# Patient Record
Sex: MALE | Race: WHITE | NOT HISPANIC OR LATINO | Employment: FULL TIME | URBAN - METROPOLITAN AREA
[De-identification: names, ages, dates, MRNs, and addresses within clinical notes are randomized per-mention and may not be internally consistent; named-entity substitution may affect disease eponyms.]

---

## 2019-09-17 ENCOUNTER — OFFICE VISIT (OUTPATIENT)
Dept: DERMATOLOGY | Facility: CLINIC | Age: 39
End: 2019-09-17
Payer: COMMERCIAL

## 2019-09-17 DIAGNOSIS — L91.8 SKIN TAG: ICD-10-CM

## 2019-09-17 DIAGNOSIS — L72.0 EIC (EPIDERMAL INCLUSION CYST): ICD-10-CM

## 2019-09-17 DIAGNOSIS — Z12.83 SKIN CANCER SCREENING: Primary | ICD-10-CM

## 2019-09-17 ASSESSMENT — PAIN SCALES - GENERAL: PAINLEVEL: NO PAIN (0)

## 2019-09-17 NOTE — LETTER
9/17/2019       RE: Ben Agustin  [address]  [facility] [address]     Dear Colleague,    Thank you for referring your patient, Ben Agustin, to the [facility]ERMAT[facility] at [facility]. Please see a copy of my visit note below.    [facility] Dermatology Note      Dermatology Problem List:  1.Cyst on the right lateral lower back  -Patient will consider treatment at a later date    Encounter Date: Sep 17, 2019    CC:  Chief Complaint   Patient presents with     Skin Check     Ben is here today for a skin check- Ben notes an area of concern on his back.          History of Present Illness:  Mr. Ben Agustin is a 39 year old male who is new to the clinic and presents for a skin check. At today's visit, the patient notes a lesion of concern on his back. He states the lesion has been previously diagnosed as a mole by an outside general practitioner and has been present for over 15 years. He notes tightness in his back on the same side of the mole of concern when he runs. The patient denies a personal and family history of melanoma. The patient denies painful, itching, tingling or bleeding lesions unless otherwise noted.    Past Medical History:   Patient Active Problem List   Diagnosis     Nausea     Screening for cardiovascular condition     History reviewed. No pertinent past medical history.  History reviewed. No pertinent surgical history.    Social History:   reports that he has never smoked. He has never used smokeless tobacco. He reports that he drinks alcohol. He reports that he does not use drugs.    Family History:  Family History   Problem Relation Age of Onset     Hypertension Father      Cancer Mother      Arthritis Maternal Grandfather      Cerebrovascular Disease Paternal Grandfather      Hypertension Paternal Grandfather      Melanoma No family hx of      Skin Cancer No family hx of        Medications:  No current outpatient  medications on file.       Allergies   Allergen Reactions     Seasonal Allergies      Congestion, itching eyes, sneezing        Review of Systems:  -Constitutional: The patient denies fatigue, fevers, chills, unintended weight loss, and night sweats.  -HEENT: Patient denies nonhealing oral sores.  -Skin: As above in HPI. No additional skin concerns.    Physical exam:  Vitals: There were no vitals taken for this visit.  GEN: This is a well developed, well-nourished male in no acute distress, in a pleasant mood.    SKIN: Full skin, which includes the head/face, both arms, chest, back, abdomen,both legs, genitalia and/or groin buttocks, digits and/or nails, was examined.  -2mm pedunculated papule on the right lateral canthus  -Right lower lateral back: 1cm indurated flesh colored papule with a central punctum   -Pacheco's skin type II, less than 100 nevi.  -No other lesions of concern on areas examined.       Impression/Plan:  1. Skin cancer screening    ABCDs of melanoma were discussed and self skin checks were advised.    Sun precaution was advised including the use of sun screens of SPF 30 or higher, sun protective clothing, and avoidance of tanning beds.    Provided sunscreen, melanoma and sun protective clothing handouts    2. Skin tag - right lateral canthus x1    Reassured benign    Not currently bothersome to patient    3. Cyst on right lateral lower back    Educated on the etiology    Reassured benign    Discussed removal via excision should patient wish to remove this    Patient deferred scheduling for excision at this time, will consider excision at a later date    CC Dr. Hickey on close of this encounter.  Follow-up prn for new or changing lesions.       Staff Involved:  Staff/Scribe    Scribe Disclosure:  Jose MARIE, am serving as a scribe to document services personally performed by Mary Rubi PA-C, based on data collection and the provider's statements to me.     Provider Disclosure:   The  documentation recorded by the scribe accurately reflects the services I personally performed and the decisions made by me.    All risks, benefits and alternatives were discussed with patient.  Patient is in agreement and understands the assessment and plan.  All questions were answered.    Mary Rubi PA-C  [facility]: Phone: [phone], Fax: [phone]                      Again, thank you for allowing me to participate in the care of your patient.      Sincerely,    Mary Rubi PA-C

## 2019-09-17 NOTE — NURSING NOTE
Dermatology Rooming Note    Ben Agustin's goals for this visit include:   Chief Complaint   Patient presents with     Skin Check     Ben is here today for a skin check- Ben notes an area of concern on his back.      ALAN Malik

## 2019-09-17 NOTE — LETTER
Date:September 18, 2019      Patient was self referred, no letter generated. Do not send.        [facility] Health Information

## 2019-09-17 NOTE — PROGRESS NOTES
[facility] Dermatology Note      Dermatology Problem List:  1.Cyst on the right lateral lower back  -Patient will consider treatment at a later date    Encounter Date: Sep 17, 2019    CC:  Chief Complaint   Patient presents with     Skin Check     Ben is here today for a skin check- Ben notes an area of concern on his back.          History of Present Illness:  Mr. Ben Agustin is a 39 year old male who is new to the clinic and presents for a skin check. At today's visit, the patient notes a lesion of concern on his back. He states the lesion has been previously diagnosed as a mole by an outside general practitioner and has been present for over 15 years. He notes tightness in his back on the same side of the mole of concern when he runs. The patient denies a personal and family history of melanoma. The patient denies painful, itching, tingling or bleeding lesions unless otherwise noted.    Past Medical History:   Patient Active Problem List   Diagnosis     Nausea     Screening for cardiovascular condition     History reviewed. No pertinent past medical history.  History reviewed. No pertinent surgical history.    Social History:   reports that he has never smoked. He has never used smokeless tobacco. He reports that he drinks alcohol. He reports that he does not use drugs.    Family History:  Family History   Problem Relation Age of Onset     Hypertension Father      Cancer Mother      Arthritis Maternal Grandfather      Cerebrovascular Disease Paternal Grandfather      Hypertension Paternal Grandfather      Melanoma No family hx of      Skin Cancer No family hx of        Medications:  No current outpatient medications on file.       Allergies   Allergen Reactions     Seasonal Allergies      Congestion, itching eyes, sneezing        Review of Systems:  -Constitutional: The patient denies fatigue, fevers, chills, unintended weight loss, and night sweats.  -HEENT: Patient denies nonhealing oral  sores.  -Skin: As above in HPI. No additional skin concerns.    Physical exam:  Vitals: There were no vitals taken for this visit.  GEN: This is a well developed, well-nourished male in no acute distress, in a pleasant mood.    SKIN: Full skin, which includes the head/face, both arms, chest, back, abdomen,both legs, genitalia and/or groin buttocks, digits and/or nails, was examined.  -2mm pedunculated papule on the right lateral canthus  -Right lower lateral back: 1cm indurated flesh colored papule with a central punctum   -Pacheco's skin type II, less than 100 nevi.  -No other lesions of concern on areas examined.       Impression/Plan:  1. Skin cancer screening    ABCDs of melanoma were discussed and self skin checks were advised.    Sun precaution was advised including the use of sun screens of SPF 30 or higher, sun protective clothing, and avoidance of tanning beds.    Provided sunscreen, melanoma and sun protective clothing handouts    2. Skin tag - right lateral canthus x1    Reassured benign    Not currently bothersome to patient    3. Cyst on right lateral lower back    Educated on the etiology    Reassured benign    Discussed removal via excision should patient wish to remove this    Patient deferred scheduling for excision at this time, will consider excision at a later date    CC Dr. Hickey on close of this encounter.  Follow-up prn for new or changing lesions.       Staff Involved:  Staff/Scribe    Scribe Disclosure:  I, Jose Cleveland, am serving as a scribe to document services personally performed by Mary Rubi PA-C, based on data collection and the provider's statements to me.     Provider Disclosure:   The documentation recorded by the scribe accurately reflects the services I personally performed and the decisions made by me.    All risks, benefits and alternatives were discussed with patient.  Patient is in agreement and understands the assessment and plan.  All questions were  answered.    Mary Rubi PA-C  [facility]: Phone: [phone], Fax: [phone]

## 2019-09-17 NOTE — PATIENT INSTRUCTIONS
Sun protective clothing and Resources     L[place] End (www.Pingup.com)  Athleta ([email or web address])  Barb Life ([email or web address] Beisbol)  Carve Designs (App DreamWorks) - affordable  Skinz (uvskinz.com)    Long sleeve - Emily Cool DRI UPF 50 or [place] PFG UPF 50  Hoodie - [place] PFG UPF 50  Swimshirt/Rash Guard - Enriqueta UPF 50 (on Amazon)  Neck - Outdoor Research Ubertubes (www.outdoorresAdspired Technologies.com)  The ABCDEs of Melanoma    Skin cancer can develop anywhere on the skin. Ask someone for help when checking your skin, especially in hard to see places. If you notice a mole different from others, or that changes, enlarges, itches, or bleeds (even if it is small), you should see a dermatologist.

## 2021-03-20 ENCOUNTER — HEALTH MAINTENANCE LETTER (OUTPATIENT)
Age: 41
End: 2021-03-20

## 2021-10-24 ENCOUNTER — HEALTH MAINTENANCE LETTER (OUTPATIENT)
Age: 41
End: 2021-10-24

## 2022-04-10 ENCOUNTER — HEALTH MAINTENANCE LETTER (OUTPATIENT)
Age: 42
End: 2022-04-10

## 2022-10-16 ENCOUNTER — HEALTH MAINTENANCE LETTER (OUTPATIENT)
Age: 42
End: 2022-10-16

## 2023-06-01 ENCOUNTER — HEALTH MAINTENANCE LETTER (OUTPATIENT)
Age: 43
End: 2023-06-01

## 2023-07-09 ASSESSMENT — ENCOUNTER SYMPTOMS
SHORTNESS OF BREATH: 0
DYSURIA: 0
JOINT SWELLING: 0
HEADACHES: 0
ARTHRALGIAS: 0
NAUSEA: 0
HEMATURIA: 0
PALPITATIONS: 0
FEVER: 0
CONSTIPATION: 0
EYE PAIN: 0
SORE THROAT: 0
NERVOUS/ANXIOUS: 1
WEAKNESS: 0
FREQUENCY: 0
ABDOMINAL PAIN: 0
PARESTHESIAS: 0
MYALGIAS: 0
HEMATOCHEZIA: 0
CHILLS: 0
DIARRHEA: 0
COUGH: 0
DIZZINESS: 0
HEARTBURN: 0

## 2023-07-10 ENCOUNTER — OFFICE VISIT (OUTPATIENT)
Dept: FAMILY MEDICINE | Facility: CLINIC | Age: 43
End: 2023-07-10
Payer: COMMERCIAL

## 2023-07-10 VITALS
TEMPERATURE: 97.5 F | HEART RATE: 79 BPM | HEIGHT: 73 IN | WEIGHT: 198 LBS | OXYGEN SATURATION: 98 % | DIASTOLIC BLOOD PRESSURE: 79 MMHG | SYSTOLIC BLOOD PRESSURE: 134 MMHG | BODY MASS INDEX: 26.24 KG/M2 | RESPIRATION RATE: 14 BRPM

## 2023-07-10 DIAGNOSIS — R06.83 SNORING: ICD-10-CM

## 2023-07-10 DIAGNOSIS — Z11.4 SCREENING FOR HIV (HUMAN IMMUNODEFICIENCY VIRUS): ICD-10-CM

## 2023-07-10 DIAGNOSIS — Z13.1 SCREENING FOR DIABETES MELLITUS: ICD-10-CM

## 2023-07-10 DIAGNOSIS — Z00.00 ROUTINE GENERAL MEDICAL EXAMINATION AT A HEALTH CARE FACILITY: Primary | ICD-10-CM

## 2023-07-10 DIAGNOSIS — Z11.59 NEED FOR HEPATITIS C SCREENING TEST: ICD-10-CM

## 2023-07-10 DIAGNOSIS — Z13.220 SCREENING CHOLESTEROL LEVEL: ICD-10-CM

## 2023-07-10 DIAGNOSIS — Z13.21 ENCOUNTER FOR VITAMIN DEFICIENCY SCREENING: ICD-10-CM

## 2023-07-10 LAB
ANION GAP SERPL CALCULATED.3IONS-SCNC: 13 MMOL/L (ref 7–15)
BUN SERPL-MCNC: 8 MG/DL (ref 6–20)
CALCIUM SERPL-MCNC: 9.7 MG/DL (ref 8.6–10)
CHLORIDE SERPL-SCNC: 102 MMOL/L (ref 98–107)
CHOLEST SERPL-MCNC: 296 MG/DL
CREAT SERPL-MCNC: 0.72 MG/DL (ref 0.67–1.17)
DEPRECATED CALCIDIOL+CALCIFEROL SERPL-MC: 32 UG/L (ref 20–75)
DEPRECATED HCO3 PLAS-SCNC: 24 MMOL/L (ref 22–29)
GFR SERPL CREATININE-BSD FRML MDRD: >90 ML/MIN/1.73M2
GLUCOSE SERPL-MCNC: 107 MG/DL (ref 70–99)
HCV AB SERPL QL IA: NONREACTIVE
HDLC SERPL-MCNC: 27 MG/DL
LDLC SERPL CALC-MCNC: ABNORMAL MG/DL
LDLC SERPL DIRECT ASSAY-MCNC: 50 MG/DL
NONHDLC SERPL-MCNC: 269 MG/DL
POTASSIUM SERPL-SCNC: 3.9 MMOL/L (ref 3.4–5.3)
SODIUM SERPL-SCNC: 139 MMOL/L (ref 136–145)
TRIGL SERPL-MCNC: 1167 MG/DL

## 2023-07-10 PROCEDURE — 90471 IMMUNIZATION ADMIN: CPT | Performed by: FAMILY MEDICINE

## 2023-07-10 PROCEDURE — 80061 LIPID PANEL: CPT | Performed by: FAMILY MEDICINE

## 2023-07-10 PROCEDURE — 90715 TDAP VACCINE 7 YRS/> IM: CPT | Performed by: FAMILY MEDICINE

## 2023-07-10 PROCEDURE — 99386 PREV VISIT NEW AGE 40-64: CPT | Mod: 25 | Performed by: FAMILY MEDICINE

## 2023-07-10 PROCEDURE — 36415 COLL VENOUS BLD VENIPUNCTURE: CPT | Performed by: FAMILY MEDICINE

## 2023-07-10 PROCEDURE — 82306 VITAMIN D 25 HYDROXY: CPT | Performed by: FAMILY MEDICINE

## 2023-07-10 PROCEDURE — 83721 ASSAY OF BLOOD LIPOPROTEIN: CPT | Mod: 59 | Performed by: FAMILY MEDICINE

## 2023-07-10 PROCEDURE — 80048 BASIC METABOLIC PNL TOTAL CA: CPT | Performed by: FAMILY MEDICINE

## 2023-07-10 PROCEDURE — 86803 HEPATITIS C AB TEST: CPT | Performed by: FAMILY MEDICINE

## 2023-07-10 ASSESSMENT — ENCOUNTER SYMPTOMS
DYSURIA: 0
PALPITATIONS: 0
MYALGIAS: 0
SHORTNESS OF BREATH: 0
CHILLS: 0
ABDOMINAL PAIN: 0
COUGH: 0
HEARTBURN: 0
EYE PAIN: 0
PARESTHESIAS: 0
NAUSEA: 0
HEMATURIA: 0
WEAKNESS: 0
NERVOUS/ANXIOUS: 1
FREQUENCY: 0
DIARRHEA: 0
JOINT SWELLING: 0
HEADACHES: 0
FEVER: 0
ARTHRALGIAS: 0
HEMATOCHEZIA: 0
CONSTIPATION: 0
DIZZINESS: 0
SORE THROAT: 0

## 2023-07-10 ASSESSMENT — PAIN SCALES - GENERAL: PAINLEVEL: NO PAIN (0)

## 2023-07-10 NOTE — PROGRESS NOTES
SUBJECTIVE:   CC: Ben is an 42 year old who presents for preventative health visit.       7/10/2023    10:00 AM   Additional Questions   Roomed by Phani RODRIGUEZ     Healthy Habits:     Getting at least 3 servings of Calcium per day:  Yes    Bi-annual eye exam:  Yes    Dental care twice a year:  NO    Sleep apnea or symptoms of sleep apnea:  Daytime drowsiness and Sleep apnea    Diet:  Regular (no restrictions)    Frequency of exercise:  4-5 days/week    Duration of exercise:  15-30 minutes    Taking medications regularly:  Yes    Medication side effects:  Not applicable    Additional concerns today:  Yes    He has some concerns about occasional snoring and occasionally holding his breath when sleeping.  He denies significant daytime somnolence.  His wife has never noticed that he regularly stops breathing at night.  He is interested in pursuing further evaluation by getting a sleep study for this.    Social history: , 6-year-old son.  He works as a  in media arts.    Today's PHQ-2 Score:       7/9/2023     3:56 PM   PHQ-2 ( 1999 Pfizer)   Q1: Little interest or pleasure in doing things 1   Q2: Feeling down, depressed or hopeless 1   PHQ-2 Score 2   Q1: Little interest or pleasure in doing things Several days   Q2: Feeling down, depressed or hopeless Several days   PHQ-2 Score 2                   Social History     Tobacco Use     Smoking status: Never     Smokeless tobacco: Never   Substance Use Topics     Alcohol use: Not Currently     Comment: Very minimal alcohol use             7/9/2023     3:54 PM   Alcohol Use   Prescreen: >3 drinks/day or >7 drinks/week? No          No data to display                Last PSA: No results found for: PSA    Reviewed orders with patient. Reviewed health maintenance and updated orders accordingly - Yes  Lab work is in process  Labs reviewed in EPIC    Reviewed and updated as needed this visit by clinical staff   Tobacco  Allergies  Meds              Reviewed  "and updated as needed this visit by Provider     Meds                 Review of Systems   Constitutional: Negative for chills and fever.   HENT: Negative for congestion, ear pain, hearing loss and sore throat.    Eyes: Negative for pain and visual disturbance.   Respiratory: Negative for cough and shortness of breath.    Cardiovascular: Negative for chest pain, palpitations and peripheral edema.   Gastrointestinal: Negative for abdominal pain, constipation, diarrhea, heartburn, hematochezia and nausea.   Genitourinary: Negative for dysuria, frequency, genital sores, hematuria, impotence, penile discharge and urgency.   Musculoskeletal: Negative for arthralgias, joint swelling and myalgias.   Skin: Negative for rash.   Neurological: Negative for dizziness, weakness, headaches and paresthesias.   Psychiatric/Behavioral: Negative for mood changes. The patient is nervous/anxious.          OBJECTIVE:   /79   Pulse 79   Temp 97.5  F (36.4  C) (Temporal)   Resp 14   Ht 1.849 m (6' 0.8\")   Wt 89.8 kg (198 lb)   SpO2 98%   BMI 26.27 kg/m      Physical Exam  GENERAL: healthy, alert and no distress  EYES: Eyes grossly normal to inspection, PERRL and conjunctivae and sclerae normal  HENT: ear canals and TM's normal, nose and mouth without ulcers or lesions  NECK: no adenopathy, no asymmetry, masses, or scars and thyroid normal to palpation  RESP: lungs clear to auscultation - no rales, rhonchi or wheezes  CV: regular rate and rhythm, normal S1 S2, no S3 or S4, no murmur, click or rub, no peripheral edema and peripheral pulses strong  ABDOMEN: soft, nontender, no hepatosplenomegaly, no masses and bowel sounds normal  MS: no gross musculoskeletal defects noted, no edema  SKIN: no suspicious lesions or rashes  NEURO: Normal strength and tone, mentation intact and speech normal  PSYCH: mentation appears normal, affect normal/bright    Diagnostic Test Results:  Labs reviewed in Epic    ASSESSMENT/PLAN:       ICD-10-CM  "   1. Routine general medical examination at a health care facility  Z00.00       2. Snoring  R06.83 Adult Sleep Eval & Management  Referral      3. Screening for HIV (human immunodeficiency virus)  Z11.4       4. Need for hepatitis C screening test  Z11.59 Hepatitis C Screen Reflex to HCV RNA Quant and Genotype     Hepatitis C Screen Reflex to HCV RNA Quant and Genotype      5. Screening cholesterol level  Z13.220 Lipid panel reflex to direct LDL Non-fasting     Lipid panel reflex to direct LDL Non-fasting      6. Screening for diabetes mellitus  Z13.1 Basic metabolic panel  (Ca, Cl, CO2, Creat, Gluc, K, Na, BUN)     Basic metabolic panel  (Ca, Cl, CO2, Creat, Gluc, K, Na, BUN)      7. Encounter for vitamin deficiency screening  Z13.21 Vitamin D Deficiency     Vitamin D Deficiency        We discussed colon cancer screening options and he would like to talk it over with his wife regarding whether or not he should do the colonoscopy or Cologuard test once he turns 43.  He is going to send me a message and let me know soon.    Patient has been advised of split billing requirements and indicates understanding: Yes      COUNSELING:   Reviewed preventive health counseling, as reflected in patient instructions       Regular exercise       Healthy diet/nutrition       Colorectal cancer screening        He reports that he has never smoked. He has never used smokeless tobacco.            Edward Santos, [facility]

## 2023-07-17 ENCOUNTER — VIRTUAL VISIT (OUTPATIENT)
Dept: FAMILY MEDICINE | Facility: CLINIC | Age: 43
End: 2023-07-17
Payer: COMMERCIAL

## 2023-07-17 DIAGNOSIS — R73.9 HYPERGLYCEMIA: ICD-10-CM

## 2023-07-17 DIAGNOSIS — E78.1 HYPERTRIGLYCERIDEMIA: Primary | ICD-10-CM

## 2023-07-17 PROCEDURE — 99214 OFFICE O/P EST MOD 30 MIN: CPT | Mod: VID | Performed by: FAMILY MEDICINE

## 2023-07-17 NOTE — PROGRESS NOTES
"Ben is a 42 year old who is being evaluated via a billable video visit.      How would you like to obtain your AVS? MyChart  If the video visit is dropped, the invitation should be resent by: Send to e-mail at: [email or web address]  Will anyone else be joining your video visit? No          Assessment & Plan     Hypertriglyceridemia  Recent nonfasting labs showed a significantly elevated triglyceride level of 1167.  He reports that he has been eating very fatty meals just prior to having this test and he is interested in having it rechecked.  I think this is reasonable.  He is planning to schedule this later in August or September.  We discussed some dietary modifications that can help.  - Lipid Profile (Chol, Trig, HDL, LDL calc); Future    Hyperglycemia  His lab results showed a nonfasting glucose of 107 which is technically normal.  In 2015 labs checked at that time showed a slightly elevated glucose and is not clear if he was fasting at the time of that lab draw.  I recommended that we check a fasting glucose when he comes in for labs next month.  We are also going to include a hemoglobin A1c level.  - Glucose; Future  - Hemoglobin A1c; Future             BMI:   Estimated body mass index is 26.27 kg/m  as calculated from the following:    Height as of 7/10/23: 1.849 m (6' 0.8\").    Weight as of 7/10/23: 89.8 kg (198 lb).   Weight management plan: Discussed healthy diet and exercise guidelines        Edward Santos, [facility]   Ben is a 42 year old, presenting for the following health issues:  Follow Up        7/17/2023     4:24 PM   Additional Questions   Roomed by Phani RODRIGUEZ     History of Present Illness       Reason for visit:  Follow-up to discuss cholesterol levels    He eats 4 or more servings of fruits and vegetables daily.He consumes 0 sweetened beverage(s) daily.He exercises with enough effort to increase his heart rate 30 to 60 minutes per day.  He exercises with " enough effort to increase his heart rate 4 days per week.   He is taking medications regularly.             He scheduled a virtual follow-up visit to discuss recent lab results from 7/10/2023 in which his triglycerides were very high at 1167.  The total cholesterol was 296, HDL 27 and LDL 50.  His glucose was 107.  He reports being under a significant amount of stress lately and he had been eating several high fatty meals on the days leading up to the test and on the morning of the test.  He is interested in having this rechecked at some point fairly soon if possible.  He is unaware of a strong family history of hyperlipidemia and he reports that there is no known family history of diabetes.      Review of Systems   Constitutional, HEENT, cardiovascular, pulmonary, GI, , musculoskeletal, neuro, skin, endocrine and psych systems are negative, except as otherwise noted.      Objective    Vitals - Patient Reported  Pain Score: No Pain (0)      Vitals:  No vitals were obtained today due to virtual visit.    Physical Exam   GENERAL: Healthy, alert and no distress  EYES: Eyes grossly normal to inspection.  No discharge or erythema, or obvious scleral/conjunctival abnormalities.  RESP: No audible wheeze, cough, or visible cyanosis.  No visible retractions or increased work of breathing.    SKIN: Visible skin clear. No significant rash, abnormal pigmentation or lesions.  NEURO: Cranial nerves grossly intact.  Mentation and speech appropriate for age.  PSYCH: Mentation appears normal, affect normal/bright, judgement and insight intact, normal speech and appearance well-groomed.                Video-Visit Details    Type of service:  Video Visit     Originating Location (pt. Location): Home    Distant Location (provider location):  On-site  Platform used for Video Visit: [email or web address]

## 2023-10-10 NOTE — PROGRESS NOTES
Outpatient Sleep Medicine Consultation:      Name: Ben Agustin MRN# [id number]   Age: 43 year old YOB: 1980     Date of Consultation: October 11, 2023  Consultation is requested by: Edward Santos DO  [address] [facility] [address]  [place] [address] Edward Santos  Primary care provider: No Ref-Primary, Physician       Reason for Sleep Consult:     Ben Agustin is sent by Edward Santos for a sleep consultation regarding snoring.    Patient s Reason for visit  Ben Agustin main reason for visit: holding breath \ sleep apnea?  Patient states problem(s) started: teenage years - college  Ben Agustin's goals for this visit: sleep apnea test? breathing aid?           Assessment and Plan:     Summary Sleep Diagnoses:  (R06.83) Snoring (primary encounter diagnosis) (R06.81) Witnessed apneic spells  Comment: Ben is a 43-year-old male who presents to the sleep clinic with symptoms of snoring, witnessed apneic spells, and occasionally feeling tired during the day. Ben has been told by friends for a long time that it seems like he has apnea and stops breathing in the night. Snoring is worse on his back. He tries to sleep only on his sides but finds himself waking on his back. He will occasionally have a snort/gasp arousal. He generally feels rested in the morning and has pretty good energy throughout the day. However, there are periods in the week when he feels exhausted but he attributes this to being busy at work. He denies difficulty initiating and maintaining sleep. He will nap 1-2 times per week, but he does not unintentionally doze. He denies restless legs. No unusual nocturnal behaviors. His STOP-BANG is 4/8- snoring, tiredness, observed apnea, and male gender.         Plan: HST-Home Sleep Apnea Test - Noxturnal Returnable  Ben is at intermediate risk for obstructive sleep apnea. Recommended a home sleep test. We reviewed treatment options for obstructive sleep apnea  including PAP therapy, mandibular advancement device, positional therapy, surgery, weight management, and hypoglossal nerve stimulator.     Comorbid Diagnoses:  No relevant medical history    Summary Recommendations:  Orders Placed This Encounter   Procedures    HST-Home Sleep Apnea Test - Noxturnal Returnable     Summary Counseling:    Sleep Testing Reviewed  Obstructive Sleep Apnea Reviewed  Complications of Untreated Sleep Apnea Reviewed    Patient will follow up 2 weeks after sleep study.   SHAUNA Suggs.    Total time spent reviewing medical records, history and physical examination, review of previous testing and interpretation as well as documentation on this date: 35 minutes    CC: Edward Santos DO          History of Present Illness:     Ben presents to the sleep clinic with concerns of snoring, witnessed apneic episodes, and feeling tired during the day. Snoring is worse on his back. He tries to sleep only on his sides. He will occasionally have a snort/gasp arousal. He generally feels rested in the morning and has pretty good energy throughout the day. There are periods in the week he feels exhausted but he attributes this to being busy.        Past Sleep Evaluations: None    SLEEP-WAKE SCHEDULE:     Work/School Days: Patient goes to school/work: Yes   Usually gets into bed at 10ish pm  Takes patient about not long to fall asleep  Has trouble falling asleep 0-1 nights per week  Wakes up in the middle of the night 1-2 times.  Wakes up due to Snorting self awake;Use the bathroom;Anxiety bathroom x1 usually  He has trouble falling back asleep 1 times a week.   It usually takes not long to get back to sleep  Patient is usually up at 7-8  Uses alarm: Yes    Weekends/Non-work Days/All Other Days:  Usually gets into bed at 10-11   Takes patient about not long to fall asleep  Patient is usually up at 8am  Uses alarm: Yes    Sleep Need  Patient gets 8 or so hours sleep on average   Patient thinks  he needs about dont know sleep    Ben Agustin prefers to sleep in this position(s): Side   Patient states they do the following activities in bed: Read    Naps  Patient takes a purposeful nap 1-2 times a week and naps are usually 20-40 min in duration. 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   He feels better after a nap: Yes  He dozes off unintentionally none days per week  Patient has had a driving accident or near-miss due to sleepiness/drowsiness: No    SLEEP DISRUPTIONS:    Breathing/Snoring  Patient snores: Yes  Other people complain about his snoring: Yes  Patient has been told he stops breathing in his sleep: Yes, he has been told by his partner and people in college.  He has issues with the following: Denies morning headaches and nocturnal heartburn/reflux.     Movement:  Patient gets pain, discomfort, with an urge to move: No Denies restless legs.   It happens when he is resting: No  It happens more at night: No  Patient has been told he kicks his legs at night: No     Behaviors in Sleep:  Ben Agustin has experienced the following behaviors while sleeping:    He has experienced sudden muscle weakness during the day: No  Pt denies bruxism, sleep talking, sleep walking, and dream enactment behavior. Pt denies sleep paralysis, hypnagogue and cataplexy.     Is there anything else you would like your sleep provider to know:      CAFFEINE AND OTHER SUBSTANCES:    Patient consumes caffeinated beverages per day: 2-3  Last caffeine use is usually: mid afternoon  List of any prescribed or over the counter stimulants that patient takes:    List of any prescribed or over the counter sleep medication patient takes:    List of previous sleep medications that patient has tried: melatonin only few times  Patient drinks alcohol to help them sleep: No  Patient drinks alcohol near bedtime: No    Family History:  Patient has a family member been diagnosed with a sleep disorder: No His father snored, but he was not  diagnosed with apnea.       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     SCALES:    EPWORTH SLEEPINESS SCALE         10/11/2023    11:09 AM    [place] Sleepiness Scale ( JUANJO Kaplan  [id number]<br>ESS - [place]/English - Final version - 21 Nov 07 - [facility] [employer].)   Sitting and reading Slight chance of dozing   Watching TV Would never doze   Sitting, inactive in a public place (e.g. a theatre or a meeting) Would never doze   As a passenger in a car for an hour without a break Slight chance of dozing   Lying down to rest in the afternoon when circumstances permit Slight chance of dozing   Sitting and talking to someone Would never doze   Sitting quietly after a lunch without alcohol Would never doze   In a car, while stopped for a few minutes in traffic Would never doze   [place] Score (MC) 3   [place] Score (Sleep) 3         INSOMNIA SEVERITY INDEX (HOLLY)          10/11/2023    10:50 AM   Insomnia Severity Index (HOLLY)   Difficulty falling asleep 0   Difficulty staying asleep 1   Problems waking up too early 0   How SATISFIED/DISSATISFIED are you with your CURRENT sleep pattern? 2   How NOTICEABLE to others do you think your sleep problem is in terms of impairing the quality of your life? 2   How WORRIED/DISTRESSED are you about your current sleep problem? 2   To what extent do you consider your sleep problem to INTERFERE with your daily functioning (e.g. daytime fatigue, mood, ability to function at work/daily chores, concentration, memory, mood, etc.) CURRENTLY? 1   HOLLY Total Score 8       Guidelines for Scoring/Interpretation:  Total score categories:  0-7 = No clinically significant insomnia   8-14 = Subthreshold insomnia   15-21 = Clinical insomnia (moderate severity)  22-28 = Clinical insomnia (severe)  Used via courtesy of www.iAgreeealth.va.gov with permission from Robert Stout PhD., [facility]      FERNANDEZ QUIROS         10/11/2023    11:10 AM   STOP BANG  "Questionnaire (  2008, the American Society of Anesthesiologists, Inc. [employer]   1. Snoring - Do you snore loudly (louder than talking or loud enough to be heard through closed doors)? No   2. Tired - Do you often feel tired, fatigued, or sleepy during daytime? Yes   3. Observed - Has anyone observed you stop breathing during your sleep? Yes   4. Blood pressure - Do you have or are you being treated for high blood pressure? No   5. BMI - BMI more than 35 kg/m2? No   6. Age - Age over 50 yr old? No   7. Neck circumference - Neck circumference greater than 40 cm? No   8. Gender - Gender male? Yes   STOP BANG Score (MC): 2 (Low risk of KADI)         GAD7         No data to display                  CAGE-AID         No data to display                CAGE-AID reprinted with permission from the [place] Medical Journal, JERMAINE Nur and JEOVANNY Fuentes, \"Conjoint screening questionnaires for alcohol and drug abuse\" [place] Medical Journal 94: 135-140, 1995.      PATIENT HEALTH QUESTIONNAIRE-9 (PHQ - 9)         No data to display                Developed by Raghu [employer], Pinky Vernon, Marcus Morales and colleagues, with an educational david from [employer] No permission required to reproduce, translate, display or distribute.        Allergies:    Allergies   Allergen Reactions    Seasonal Allergies      Congestion, itching eyes, sneezing        Medications:    Current Outpatient Medications   Medication Sig Dispense Refill    Multiple Vitamin (MULTIVITAMIN ADULT PO) Take 1 tablet by mouth daily      Omega-3 Fatty Acids (FISH OIL CONCENTRATE) 1000 MG CAPS Take 2,150 mg by mouth daily      Vitamin D3 (CHOLECALCIFEROL) 125 MCG (5000 UT) tablet Take 125 mcg by mouth daily         Problem List:  Patient Active Problem List    Diagnosis Date Noted    Nausea 03/20/2015     Priority: Medium    Screening for cardiovascular condition 03/20/2015     Priority: Medium     Problem list name " updated by automated process. Provider to review          Past Medical/Surgical History:  Past Medical History:   Diagnosis Date    Depressive disorder 06/01/2023    Mild, comes and goes, I'm very burned out from overwork     History reviewed. No pertinent surgical history.    Social History:  Social History     Socioeconomic History    Marital status:      Spouse name: Not on file    Number of children: Not on file    Years of education: Not on file    Highest education level: Not on file   Occupational History    Not on file   Tobacco Use    Smoking status: Never    Smokeless tobacco: Never   Substance and Sexual Activity    Alcohol use: Not Currently    Drug use: No    Sexual activity: Yes     Partners: Female     Birth control/protection: Condom   Other Topics Concern    Parent/sibling w/ CABG, MI or angioplasty before 65F 55M? No   Social History Narrative    Not on file     Social Determinants of Health     Financial Resource Strain: Not on file   Food Insecurity: Not on file   Transportation Needs: Not on file   Physical Activity: Not on file   Stress: Not on file   Social Connections: Not on file   Interpersonal Safety: Not on file   Housing Stability: Not on file       Family History:  Family History   Problem Relation Age of Onset    Cancer Mother     Breast Cancer Mother         Survived breast cancer    Hypertension Father         Vascular dementia, untreated high blood pressure    Substance Abuse Father         Alcoholic early 20's, sober for rest of life after treatment    Dementia Father     Breast Cancer Maternal Grandmother         Survived breast cancer    Arthritis Maternal Grandfather     Cerebrovascular Disease Paternal Grandfather     Hypertension Paternal Grandfather         Stroke, high blood pressure    Hypertension Brother     Hypertension Brother     Melanoma No family hx of     Skin Cancer No family hx of        Review of Systems:  A complete review of systems reviewed by me is  "negative with the exeption of what has been mentioned in the history of present illness.  In the last TWO WEEKS have you experienced any of the following symptoms?    Fevers: No   Night Sweats: No   Weight Gain: No   Pain at Night: No   Double Vision: No   Changes in Vision: No   Difficulty Breathing through Nose: No   In the last TWO WEEKS have you experienced any of the following symptoms?    Sore Throat in Morning: No   Dry Mouth in the Morning: No   Shortness of Breath Lying Flat: No   Shortness of Breath With Activity: No   Awakening with Shortness of Breath: No   Increased Cough: No   In the last TWO WEEKS have you experienced any of the following symptoms?    Heart Racing at Night: Yes   Swelling in Feet or Legs: No   Diarrhea at Night: No   Heartburn at Night: No   Urinating More than Once at Night: Yes   In the last TWO WEEKS have you experienced any of the following symptoms?    Losing Control of Urine at Night: No   Joint Pains at Night: No   Headaches in Morning: No   Weakness in Arms or Legs: No   Depressed Mood: Yes   Anxiety: Yes     Physical Examination:  Vitals: /78   Pulse 68   Resp 16   Ht 1.88 m (6' 2\")   Wt 94.4 kg (208 lb 3.2 oz)   SpO2 99%   BMI 26.73 kg/m    BMI= Body mass index is 26.73 kg/m .    Neck Cir (cm): 36 cm    GENERAL APPEARANCE: healthy, alert, no distress, and cooperative  EYES: Eyes grossly normal to inspection, wearing glasses   HENT: oropharynx crowded  NECK: no asymmetry, masses, or scars  RESP: no increased work of breathing noted, no audible cough or wheeze  PSYCH: mentation appears normal and affect normal/bright  Mallampati Class: III.  Tonsillar Stage: 1 hidden by pillars.         Data: All pertinent previous laboratory data reviewed     Recent Labs   Lab Test 07/10/23  1054      POTASSIUM 3.9   CHLORIDE 102   CO2 24   ANIONGAP 13   *   BUN 8.0   CR 0.72   JEAN-PAUL 9.7       No results for input(s): WBC, RBC, HGB, HCT, MCV, MCH, MCHC, RDW, PLT in the " "last 85366 hours.    No results for input(s): PROTTOTAL, ALBUMIN, BILITOTAL, ALKPHOS, AST, ALT, BILIDIRECT in the last 68056 hours.    TSH (mU/L)   Date Value   03/20/2015 3.92       No results found for: UAMP, UBARB, BENZODIAZEUR, UCANN, UCOC, OPIT, UPCP    No results found for: \"IRONSAT\", \"[id number]\", \"TIGIST\"    No results found for: PH, PHARTERIAL, PO2, DC0TEQLVBAA, SAT, PCO2, HCO3, BASEEXCESS, JAZZ, BEB    @LABRCNTIPR(phv:4,pco2v:4,po2v:4,hco3v:4,mt:4,o2per:4)@    Echocardiology: No results found for this or any previous visit (from the past 4320 hour(s)).    Chest x-ray: No results found for this or any previous visit from the past 365 days.      Chest CT: No results found for this or any previous visit from the past 365 days.      PFT: Most Recent [facility] Pulmonary Function Testing    No results found for: \"20001\"  No results found for: \"20002\"  No results found for: \"20003\"  No results found for: \"20015\"  No results found for: \"20016\"  No results found for: \"20027\"  No results found for: \"20028\"  No results found for: \"20029\"  No results found for: \"20079\"  No results found for: \"20080\"  No results found for: \"20081\"  No results found for: \"20335\"  No results found for: \"20105\"  No results found for: \"20053\"  No results found for: \"20054\"  No results found for: \"20055\"      Karon Benson, APRN CNP 10/11/2023   "

## 2023-10-11 ENCOUNTER — OFFICE VISIT (OUTPATIENT)
Dept: SLEEP MEDICINE | Facility: CLINIC | Age: 43
End: 2023-10-11
Attending: FAMILY MEDICINE
Payer: COMMERCIAL

## 2023-10-11 VITALS
HEART RATE: 68 BPM | SYSTOLIC BLOOD PRESSURE: 127 MMHG | RESPIRATION RATE: 16 BRPM | OXYGEN SATURATION: 99 % | HEIGHT: 74 IN | WEIGHT: 208.2 LBS | DIASTOLIC BLOOD PRESSURE: 78 MMHG | BODY MASS INDEX: 26.72 KG/M2

## 2023-10-11 DIAGNOSIS — R06.81 WITNESSED APNEIC SPELLS: ICD-10-CM

## 2023-10-11 DIAGNOSIS — R06.83 SNORING: Primary | ICD-10-CM

## 2023-10-11 PROCEDURE — 99203 OFFICE O/P NEW LOW 30 MIN: CPT

## 2023-10-11 RX ORDER — SWAB
2150 SWAB, NON-MEDICATED MISCELLANEOUS DAILY
COMMUNITY

## 2023-10-11 ASSESSMENT — SLEEP AND FATIGUE QUESTIONNAIRES
HOW LIKELY ARE YOU TO NOD OFF OR FALL ASLEEP WHILE LYING DOWN TO REST IN THE AFTERNOON WHEN CIRCUMSTANCES PERMIT: SLIGHT CHANCE OF DOZING
HOW LIKELY ARE YOU TO NOD OFF OR FALL ASLEEP WHILE SITTING AND TALKING TO SOMEONE: WOULD NEVER DOZE
HOW LIKELY ARE YOU TO NOD OFF OR FALL ASLEEP WHEN YOU ARE A PASSENGER IN A CAR FOR AN HOUR WITHOUT A BREAK: SLIGHT CHANCE OF DOZING
HOW LIKELY ARE YOU TO NOD OFF OR FALL ASLEEP WHILE SITTING AND READING: SLIGHT CHANCE OF DOZING
HOW LIKELY ARE YOU TO NOD OFF OR FALL ASLEEP WHILE WATCHING TV: WOULD NEVER DOZE
HOW LIKELY ARE YOU TO NOD OFF OR FALL ASLEEP WHILE SITTING QUIETLY AFTER LUNCH WITHOUT ALCOHOL: WOULD NEVER DOZE
HOW LIKELY ARE YOU TO NOD OFF OR FALL ASLEEP IN A CAR, WHILE STOPPED FOR A FEW MINUTES IN TRAFFIC: WOULD NEVER DOZE
HOW LIKELY ARE YOU TO NOD OFF OR FALL ASLEEP WHILE SITTING INACTIVE IN A PUBLIC PLACE: WOULD NEVER DOZE

## 2023-10-11 NOTE — PATIENT INSTRUCTIONS
"        MY TREATMENT INFORMATION FOR SLEEP APNEA-  Ben Agustin    DOCTOR : BONNY Wright CNP    Am I having a sleep study at a sleep center?  --->Due to normal delays, you will be contacted within 2-4 weeks to schedule    Am I having a home sleep study?  --->Watch the video for the device you are using:    -/drop off device- https://www.1stdibs.com/watch?v=yGGFBdELGhk    Frequently asked questions:  1. What is Obstructive Sleep Apnea (KADI)? KADI is the most common type of sleep apnea. Apnea means, \"without breath.\"  Apnea is most often caused by narrowing or collapse of the upper airway as muscles relax during sleep.   Almost everyone has occasional apneas. Most people with sleep apnea have had brief interruptions at night frequently for many years.  The severity of sleep apnea is related to how frequent and severe the events are.   2. What are the consequences of KADI? Symptoms include: feeling sleepy during the day, snoring loudly, gasping or stopping of breathing, trouble sleeping, and occasionally morning headaches or heartburn at night.  Sleepiness can be serious and even increase the risk of falling asleep while driving. Other health consequences may include development of high blood pressure and other cardiovascular disease in persons who are susceptible. Untreated KADI can contribute to heart disease, stroke and diabetes.   3. What are the treatment options? In most situations, sleep apnea is a lifelong disease that must be managed with daily therapy. Medications are not effective for sleep apnea and surgery is generally not considered until other therapies have been tried. Your treatment is your choice . Continuous Positive Airway (CPAP) works right away and is the therapy that is effective in nearly everyone. An oral device to hold your jaw forward is usually the next most reliable option. Other options include postioning devices (to keep you off your back), weight loss, and surgery " including a tongue pacing device. There is more detail about some of these options below.  4. Are my sleep studies covered by insurance? Although we will request verification of coverage, we advise you also check in advance of the study to ensure there is coverage.    Important tips for those choosing CPAP and similar devices  For new devices, sign up for device KATIE to monitor your device for your followup visits  We encourage you to utilize the [employer] katie or website (myAir web (resmed.com) ) to monitor your therapy progress and share the data with your healthcare team when you discuss your sleep apnea.                                                    Know your equipment:  CPAP is continuous positive airway pressure that prevents obstructive sleep apnea by keeping the throat from collapsing while you are sleeping. In most cases, the device is  smart  and can slowly self-adjusts if your throat collapses and keeps a record every day of how well you are treated-this information is available to you and your care team.  BPAP is bilevel positive airway pressure that keeps your throat open and also assists each breath with a pressure boost to maintain adequate breathing.  Special kinds of BPAP are used in patients who have inadequate breathing from lung or heart disease. In most cases, the device is  smart  and can slowly self-adjusts to assist breathing. Like CPAP, the device keeps a record of how well you are treated.  Your mask is your connection to the device. You get to choose what feels most comfortable and the staff will help to make sure if fits. Here: are some examples of the different masks that are available:       Key points to remember on your journey with sleep apnea:  Sleep study.  PAP devices often need to be adjusted during a sleep study to show that they are effective and adjusted right.  Good tips to remember: Try wearing just the mask during a quiet time during the day so your body adapts to  wearing it. A humidifier is recommended for comfort in most cases to prevent drying of your nose and throat. Allergy medication from your provider may help you if you are having nasal congestion.  Getting settled-in. It takes more than one night for most of us to get used to wearing a mask. Try wearing just the mask during a quiet time during the day so your body adapts to wearing it. A humidifier is recommended for comfort in most cases. Our team will work with you carefully on the first day and will be in contact within 4 days and again at 2 and 4 weeks for advice and remote device adjustments. Your therapy is evaluated by the device each day.   Use it every night. The more you are able to sleep naturally for 7-8 hours, the more likely you will have good sleep and to prevent health risks or symptoms from sleep apnea. Even if you use it 4 hours it helps. Occasionally all of us are unable to use a medical therapy, in sleep apnea, it is not dangerous to miss one night.   Communicate. Call our skilled team on the number provided on the first day if your visit for problems that make it difficult to wear the device. Over 2 out of 3 patients can learn to wear the device long-term with help from our team. Remember to call our team or your sleep providers if you are unable to wear the device as we may have other solutions for those who cannot adapt to mask CPAP therapy. It is recommended that you sleep your sleep provider within the first 3 months and yearly after that if you are not having problems.   Use it for your health. We encourage use of CPAP masks during daytime quiet periods to allow your face and brain to adapt to the sensation of CPAP so that it will be a more natural sensation to awaken to at night or during naps. This can be very useful during the first few weeks or months of adapting to CPAP though it does not help medically to wear CPAP during wakefulness and  should not be used as a strategy just to meet  guidelines.  Take care of your equipment. Make sure you clean your mask and tubing using directions every day and that your filter and mask are replaced as recommended or if they are not working.     BESIDES CPAP, WHAT OTHER THERAPIES ARE THERE?    Positioning Device  Positioning devices are generally used when sleep apnea is mild and only occurs on your back.This example shows a pillow that straps around the waist. It may be appropriate for those whose sleep study shows milder sleep apnea that occurs primarily when lying flat on one's back. Preliminary studies have shown benefit but effectiveness at home may need to be verified by a home sleep test. These devices are generally not covered by medical insurance.  Examples of devices that maintain sleeping on the back to prevent snoring and mild sleep apnea.    Belt type body positioner  http://TickTickTickets/    Electronic reminder  http://nightshifttherapy.com/            Oral Appliance  What is oral appliance therapy?  An oral appliance device fits on your teeth at night like a retainer used after having braces. The device is made by a specialized dentist and requires several visits over 1-2 months before a manufactured device is made to fit your teeth and is adjusted to prevent your sleep apnea. Once an oral device is working properly, snoring should be improved. A home sleep test may be recommended at that time if to determine whether the sleep apnea is adequately treated.       Some things to remember:  -Oral devices are often, but not always, covered by your medical insurance. Be sure to check with your insurance provider.   -If you are referred for oral therapy, you will be given a list of specialized dentists to consider or you may choose to visit the Web site of the American Academy of Dental Sleep Medicine  -Oral devices are less likely to work if you have severe sleep apnea or are extremely overweight.     More detailed information  An oral appliance is a  small acrylic device that fits over the upper and lower teeth  (similar to a retainer or a mouth guard). This device slightly moves jaw forward, which moves the base of the tongue forward, opens the airway, improves breathing for effective treat snoring and obstructive sleep apnea in perhaps 7 out of 10 people .  The best working devices are custom-made by a dental device  after a mold is made of the teeth 1, 2, 3.  When is an oral appliance indicated?  Oral appliance therapy is recommended as a first-line treatment for patients with primary snoring, mild sleep apnea, and for patients with moderate sleep apnea who prefer appliance therapy to use of CPAP4, 5. Severity of sleep apnea is determined by sleep testing and is based on the number of respiratory events per hour of sleep.   How successful is oral appliance therapy?  The success rate of oral appliance therapy in patients with mild sleep apnea is 75-80% while in patients with moderate sleep apnea it is 50-70%. The chance of success in patients with severe sleep apnea is 40-50%. The research also shows that oral appliances have a beneficial effect on the cardiovascular health of KADI patients at the same magnitude as CPAP therapy7.  Oral appliances should be a second-line treatment in cases of severe sleep apnea, but if not completely successful then a combination therapy utilizing CPAP plus oral appliance therapy may be effective. Oral appliances tend to be effective in a broad range of patients although studies show that the patients who have the highest success are females, younger patients, those with milder disease, and less severe obesity. 3, 6.   Finding a dentist that practices dental sleep medicine  Specific training is available through the American Academy of Dental Sleep Medicine for dentists interested in working in the field of sleep. To find a dentist who is educated in the field of sleep and the use of oral appliances, near you, visit  the Web site of the American Academy of Dental Sleep Medicine.    References  1. Olivia, et al. Objectively measured vs self-reported compliance during oral appliance therapy for sleep-disordered breathing. Chest 2013; 144(5): 6164-4940.  2. Jose et al. Objective measurement of compliance during oral appliance therapy for sleep-disordered breathing. Thorax 2013; 68(1): 91-96.  3. Dilcia et al. Mandibular advancement devices in 620 men and women with KADI and snoring: tolerability and predictors of treatment success. Chest 2004; 125: 7331-9192.  4. Dao et al. Oral appliances for snoring and KADI: a review. Sleep 2006; 29: 244-262.  5. Luanne et al. Oral appliance treatment for KADI: an update. J Clin Sleep Med 2014; 10(2): 215-227.  6. Jaspreet et al. Predictors of OSAH treatment outcome. J Dent Res 2007; 86: 0601-4789.      Weight Loss:    Weight loss is a long-term strategy that may improve sleep apnea in some patients.    Weight management is a personal decision and the decision should be based on your interest and the potential benefits.  If you are interested in exploring weight loss strategies, the following discussion covers the impact on weight loss on sleep apnea and the approaches that may be successful.    Being overweight does not necessarily mean you will have health consequences.  Those who have BMI over 35 or over 27 with existing medical conditions carries greater risk.   Weight loss decreases severity of sleep apnea in most people with obesity. For those with mild obesity who have developed snoring with weight gain, even 15-30 pound weight loss can improve and occasionally eliminate sleep apnea.  Structured and life-long dietary and health habits are necessary to lose weight and keep healthier weight levels.     Though there may be significant health benefits from weight loss, long-term weight loss is very difficult to achieve- studies show success with dietary management in  less than 10% of people. In addition, substantial weight loss may require years of dietary control and may be difficult if patients have severe obesity. In these cases, surgical management may be considered.  Finally, older individuals who have tolerated obesity without health complications may be less likely to benefit from weight loss strategies.      BMI 26    Surgery:    Surgery for obstructive sleep apnea is considered generally only when other therapies fail to work. Surgery may be discussed with you if you are having a difficult time tolerating CPAP and or when there is an abnormal structure that requires surgical correction.  Nose and throat surgeries often enlarge the airway to prevent collapse.  Most of these surgeries create pain for 1-2 weeks and up to half of the most common surgeries are not effective throughout life.  You should carefully discuss the benefits and drawbacks to surgery with your sleep provider and surgeon to determine if it is the best solution for you.   More information  Surgery for KADI is directed at areas that are responsible for narrowing or complete obstruction of the airway during sleep.  There are a wide range of procedures available to enlarge and/or stabilize the airway to prevent blockage of breathing in the three major areas where it can occur: the palate, tongue, and nasal regions.  Successful surgical treatment depends on the accurate identification of the factors responsible for obstructive sleep apnea in each person.  A personalized approach is required because there is no single treatment that works well for everyone.  Because of anatomic variation, consultation with an examination by a sleep surgeon is a critical first step in determining what surgical options are best for each patient.  In some cases, examination during sedation may be recommended in order to guide the selection of procedures.  Patients will be counseled about risks and benefits as well as the typical  recovery course after surgery. Surgery is typically not a cure for a person s KADI.  However, surgery will often significantly improve one s KADI severity (termed  success rate ).  Even in the absence of a cure, surgery will decrease the cardiovascular risk associated with OSA7; improve overall quality of life8 (sleepiness, functionality, sleep quality, etc).  Palate Procedures:  Patients with KADI often have narrowing of their airway in the region of their tonsils and uvula.  The goals of palate procedures are to widen the airway in this region as well as to help the tissues resist collapse.  Modern palate procedure techniques focus on tissue conservation and soft tissue rearrangement, rather than tissue removal.  Often the uvula is preserved in this procedure. Residual sleep apnea is common in patient after pharyngoplasty with an average reduction in sleep apnea events of 33%2.    Tongue Procedures:  ExamWhile patients are awake, the muscles that surround the throat are active and keep this region open for breathing. These muscles relax during sleep, allowing the tongue and other structures to collapse and block breathing.  There are several different tongue procedures available.  Selection of a tongue base procedure depends on characteristics seen on physical exam.  Generally, procedures are aimed at removing bulky tissues in this area or preventing the back of the tongue from falling back during sleep.  Success rates for tongue surgery range from 50-62%3.  Hypoglossal Nerve Stimulation:  Hypoglossal nerve stimulation has recently received approval from the [place] Food and Drug Administration for the treatment of obstructive sleep apnea.  This is based on research showing that the system was safe and effective in treating sleep apnea6.  Results showed that the median AHI score decreased 68%, from 29.3 to 9.0. This therapy uses an implant system that senses breathing patterns and delivers mild stimulation to  airway muscles, which keeps the airway open during sleep.  The system consists of three fully implanted components: a small generator (similar in size to a pacemaker), a breathing sensor, and a stimulation lead.  Using a small handheld remote, a patient turns the therapy on before bed and off upon awakening.    Candidates for this device must be greater than 18 years of age, have moderate to severe KADI (AHI between 15-65), BMI less than 35, have tried CPAP/oral appliance for at least 8 weeks without success, and have appropriate upper airway anatomy (determined by a sleep endoscopy performed by Dr. Ever Maria).  Hypoglossal Nerve Stimulation Pathway:    The sleep surgeon s office will work with the patient through the insurance prior-authorization process (including communications and appeals).    Nasal Procedures:  Nasal obstruction can interfere with nasal breathing during the day and night.  Studies have shown that relief of nasal obstruction can improve the ability of some patients to tolerate positive airway pressure therapy for obstructive sleep apnea1.  Treatment options include medications such as nasal saline, topical corticosteroid and antihistamine sprays, and oral medications such as antihistamines or decongestants. Non-surgical treatments can include external nasal dilators for selected patients. If these are not successful by themselves, surgery can improve the nasal airway either alone or in combination with these other options.  Combination Procedures:  Combination of surgical procedures and other treatments may be recommended, particularly if patients have more than one area of narrowing or persistent positional disease.  The success rate of combination surgery ranges from 66-80%2,3.    References  Carol RODRIGUEZ. The Role of the Nose in Snoring and Obstructive Sleep Apnoea: An Update.  Eur Arch Otorhinolaryngol. 2011; 268: 1365-73.   Dannie SM; Sylvia ELIAS; Aide JR; Pallanch JF; Goldy PATINO; Jami SG;  Luke REY. Surgical modifications of the upper airway for obstructive sleep apnea in adults: a systematic review and meta-analysis. SLEEP 2010;33(10):2594-9755. Marilin RUFFIN. Hypopharyngeal surgery in obstructive sleep apnea: an evidence-based medicine review.  Arch Otolaryngol Head Neck Surg. 2006 Feb;132(2):206-13.  Kendell YH1, Marimar Y, Jonah CHRISTINA. The efficacy of anatomically based multilevel surgery for obstructive sleep apnea. Otolaryngol Head Neck Surg. 2003 Oct;129(4):327-35.  Kezirian E, Goldberg A. Hypopharyngeal Surgery in Obstructive Sleep Apnea: An Evidence-Based Medicine Review. Arch Otolaryngol Head Neck Surg. 2006 Feb;132(2):206-13.  Iesha HICKMAN et al. Upper-Airway Stimulation for Obstructive Sleep Apnea.  N Engl J Med. 2014 Jan 9;370(2):139-49.  Syeda Y et al. Increased Incidence of Cardiovascular Disease in Middle-aged Men with Obstructive Sleep Apnea. Am J Respir Crit Care Med; 2002 166: 159-165  Harrison EM et al. Studying Life Effects and Effectiveness of Palatopharyngoplasty (SLEEP) study: Subjective Outcomes of Isolated Uvulopalatopharyngoplasty. Otolaryngol Head Neck Surg. 2011; 144: 623-631.    WHAT IF I ONLY HAVE SNORING?    Mandibular advancement devices, lateral sleep positioning, long-term weight loss and treatment of nasal allergies have been shown to improve snoring.  Exercising tongue muscles with a game (https://apps.BeauCoo/us/katie/soundly-reduce-snoring/bb1499644324) or stimulating the tongue during the day with a device (https://doi.org/10.3390/vyz36668867) have improved snoring in some individuals.    Remember to Drive Safe... Drive Alive     Sleep health profoundly affects your health, mood, and your safety.  Thirty three percent of the population (one in three of us) is not getting enough sleep and many have a sleep disorder. Not getting enough sleep or having an untreated / undertreated sleep condition may make us sleepy without even knowing it. In fact, our driving could be  dramatically impaired due to our sleep health. As your provider, here are some things I would like you to know about driving:     Here are some warning signs for impairment and dangerous drowsy driving:              -Having been awake more than 16 hours               -Looking tired               -Eyelid drooping              -Head nodding (it could be too late at this point)              -Driving for more than 30 minutes     Some things you could do to make the driving safer if you are experiencing some drowsiness:              -Stop driving and rest              -Call for transportation              -Make sure your sleep disorder is adequately treated     Some things that have been shown NOT to work when experiencing drowsiness while driving:              -Turning on the radio              -Opening windows              -Eating any  distracting  /  entertaining  foods (e.g., sunflower seeds, candy, or any other)              -Talking on the phone      Your decision may not only impact your life, but also the life of others. Please, remember to drive safe for yourself and all of us.

## 2023-10-11 NOTE — NURSING NOTE
Scheduled HST for 12/4/2023 with follow up 12/21/2023 virtual. Printed AVS.     [facility] Facilitator   [facility]

## 2023-12-04 ENCOUNTER — OFFICE VISIT (OUTPATIENT)
Dept: SLEEP MEDICINE | Facility: CLINIC | Age: 43
End: 2023-12-04
Payer: COMMERCIAL

## 2023-12-04 DIAGNOSIS — R06.81 WITNESSED APNEIC SPELLS: ICD-10-CM

## 2023-12-04 DIAGNOSIS — R06.83 SNORING: ICD-10-CM

## 2023-12-04 PROCEDURE — G0399 HOME SLEEP TEST/TYPE 3 PORTA: HCPCS | Performed by: STUDENT IN AN ORGANIZED HEALTH CARE EDUCATION/TRAINING PROGRAM

## 2023-12-04 NOTE — NURSING NOTE
Pt is completing a home sleep test. Pt was instructed on how to put on the Noxturnal T3 device and associated equipment before going to bed and given the opportunity to practice putting it on before leaving the sleep center. Pt was reminded to bring the home sleep test kit back to the center tomorrow, at agreed upon time for download and reporting.   Neck circumference: 36 CM / 13.75 inches.

## 2023-12-05 ENCOUNTER — DOCUMENTATION ONLY (OUTPATIENT)
Dept: SLEEP MEDICINE | Facility: CLINIC | Age: 43
End: 2023-12-05
Payer: COMMERCIAL

## 2023-12-05 NOTE — PROGRESS NOTES
HST POST-STUDY QUESTIONNAIRE    What time did you go to bed?  Around 10:30  How long do you think it took to fall asleep?  Not long  What time did you wake up to start the day?  8 AM but woke up and tossed and turned from 5 AM on.   Did you get up during the night at all?  Yes urination 2X last night.  If you woke up, do you remember approximately what time(s)? I'm sorry I do not  Did you have any difficulty with the equipment?  Pulse Oximiter squeezed my index finger in an uncomfortable way that woke up up several times.   Did you us any type of treatment with this study?  None  Was the head of the bed elevated? No  Did you sleep in a recliner?  No  Did you stop using CPAP at least 3 days before this test?  NA  Any other information you'd like us to know? I Normally urinate once at night on average. ( I drink lots of water in the evenings to stay hydrated). During stressful times I wake up early mornings between 4-6 Am and toss and turn before calming back down and falling asleep. My spouse says I don't snore every nigh, only sometimes when I'm lying on my back. I try to sleep on my side most of the time.

## 2023-12-06 NOTE — PROGRESS NOTES
This HSAT was performed using a Noxturnal T3 device which recorded snore, sound, movement activity, body position, nasal pressure, oronasal thermal airflow, pulse, oximetry and both chest and abdominal respiratory effort. HSAT data was restricted to the time patient states they were in bed.     HSAT was scored using 1B 4% hypopnea rule.     HST AHI (Non-PAT): 3.5  Snoring was reported as moderate, loud, and intermittent.  Time with SpO2 below 89% was 1.3 minutes.   Overall signal quality was good     Pt will follow up with sleep provider to determine appropriate therapy.

## 2023-12-18 NOTE — PROCEDURES
"HOME SLEEP STUDY INTERPRETATION        Patient: Ben Agustin  MRN: [id number]  YOB: 1980  Study Date: 12/4/2023  PCP/Referring Provider: No Ref-Primary, Physician;  Ordering Provider: Sonia Castillo MD      Indications for Home Study: Ben Agustin is a 43 year old male with no significant medical history who presents with symptoms suggestive of obstructive sleep apnea.    Estimated body mass index is 26.73 kg/m  as calculated from the following:    Height as of 10/11/23: 1.88 m (6' 2\").    Weight as of 10/11/23: 94.4 kg (208 lb 3.2 oz).  Total score - [place]: 3 (10/11/2023 11:09 AM)  Total Score: 3 (10/11/2023 11:10 AM)        Data: A full night home sleep study was performed recording the standard physiologic parameters including body position, movement, sound, nasal pressure, thermal oral airflow, chest and abdominal movements with respiratory inductance plethysmography, and oxygen saturation by pulse oximetry. Pulse rate was estimated by oximetry recording. This study was considered adequate based on > 4 hours of quality oximetry and respiratory recording. As specified by the AASM Manual for the Scoring of Sleep and Associated events, version 2.3, Rule VIII.D 1B, 4% oxygen desaturation scoring for hypopneas is used as a standard of care on all home sleep apnea testing.        Analysis Time:  534.5 minutes        Respiration:   Sleep Associated Hypoxemia: sustained hypoxemia was present. Baseline oxygen saturation was 94%.  Time with saturation less than or equal to 88% was 0.6 minutes. The lowest oxygen saturation was 83.0%.   Snoring: Snoring was present.  Respiratory events: The home study revealed a presence of 2 obstructive apneas and 1 mixed and central apneas. There were 28 hypopneas resulting in a combined apnea/hypopnea index [AHI] of 3.5 events per hour.  AHI was 6.2 per hour supine, 0 per hour prone, 3.4 per hour on left side, and 0.4 per hour on right side.   Pattern: " Excluding events noted above, respiratory rate and pattern was Normal.      Position: Percent of time spent: supine - 36.1%, prone - 0%, on left - 33.1%, on right - 30.8%.      Heart Rate: By pulse oximetry normal rate was noted.       Assessment:   No clinically significant obstructive sleep apnea.  Home Sleep Study may not be sensitive enough to detect true nature of underlying sleep disordered breathing.  Sleep associated hypoxemia was not present.    Recommendations:  Consider positional therapy or repeat sleep testing with PSG .  Suggest optimizing sleep hygiene and avoiding sleep deprivation.        Diagnosis Code(s): Snoring R06.83    Sonia Castillo MD, December 18, 2023   Diplomate, American Board of Internal Medicine, Sleep Medicine

## 2023-12-20 ASSESSMENT — SLEEP AND FATIGUE QUESTIONNAIRES
HOW LIKELY ARE YOU TO NOD OFF OR FALL ASLEEP WHEN YOU ARE A PASSENGER IN A CAR FOR AN HOUR WITHOUT A BREAK: WOULD NEVER DOZE
HOW LIKELY ARE YOU TO NOD OFF OR FALL ASLEEP WHILE SITTING QUIETLY AFTER LUNCH WITHOUT ALCOHOL: WOULD NEVER DOZE
HOW LIKELY ARE YOU TO NOD OFF OR FALL ASLEEP IN A CAR, WHILE STOPPED FOR A FEW MINUTES IN TRAFFIC: WOULD NEVER DOZE
HOW LIKELY ARE YOU TO NOD OFF OR FALL ASLEEP WHILE SITTING INACTIVE IN A PUBLIC PLACE: WOULD NEVER DOZE
HOW LIKELY ARE YOU TO NOD OFF OR FALL ASLEEP WHILE SITTING AND TALKING TO SOMEONE: WOULD NEVER DOZE
HOW LIKELY ARE YOU TO NOD OFF OR FALL ASLEEP WHILE SITTING AND READING: WOULD NEVER DOZE
HOW LIKELY ARE YOU TO NOD OFF OR FALL ASLEEP WHILE LYING DOWN TO REST IN THE AFTERNOON WHEN CIRCUMSTANCES PERMIT: SLIGHT CHANCE OF DOZING
HOW LIKELY ARE YOU TO NOD OFF OR FALL ASLEEP WHILE WATCHING TV: WOULD NEVER DOZE

## 2023-12-21 ENCOUNTER — VIRTUAL VISIT (OUTPATIENT)
Dept: SLEEP MEDICINE | Facility: CLINIC | Age: 43
End: 2023-12-21
Payer: COMMERCIAL

## 2023-12-21 VITALS — WEIGHT: 205 LBS | HEIGHT: 74 IN | BODY MASS INDEX: 26.31 KG/M2

## 2023-12-21 DIAGNOSIS — R06.83 SNORING: Primary | ICD-10-CM

## 2023-12-21 PROCEDURE — 99213 OFFICE O/P EST LOW 20 MIN: CPT | Mod: VID

## 2023-12-21 ASSESSMENT — PAIN SCALES - GENERAL: PAINLEVEL: NO PAIN (0)

## 2023-12-21 NOTE — PROGRESS NOTES
Virtual Visit Details    Type of service: Video Visit   Start time: 9:30 AM  End time: 9:46 AM  Originating Location (pt. Location): Home    Distant Location (provider location): Off-site  Platform used for Video Visit: [facility]    Sleep Study Follow-Up Visit:    Date on this visit: 12/21/2023    Ben Agustin comes in today for follow-up of his home sleep study done on 12/04/2023 for snoring and witnessed apneic spells.             These findings were reviewed with patient.     Past medical/surgical history, family history, social history, medications and allergies were reviewed.      Problem List:  Patient Active Problem List    Diagnosis Date Noted    Nausea 03/20/2015     Priority: Medium    Screening for cardiovascular condition 03/20/2015     Priority: Medium     Problem list name updated by automated process. Provider to review        Impression/Plan:  (R06.83) Snoring (primary encounter diagnosis)  Comment: Ben presents to the sleep clinic to review the results of his home sleep study done on 12/04/2023. The results of his sleep study were reviewed in depth. Informed Ben he does not have significant sleep apnea overall; however, while supine his AHI of 6.2 events/hr is consistent with mild sleep apnea. No sleep associated hypoxemia was present.   Plan: We discussed positional therapy and oral appliances. Ben's current dentist offers oral appliances for apnea and snoring so he is going to see how much one of those would cost. He plans to try the Sleep Noodle as well.     He will follow up with me in the future if symptoms worsen or fail to improve.    Total time spent reviewing medical records, history and physical examination, review of previous testing and interpretation as well as documentation on this date: 24 minutes     OBNNY Wright CNP    CC: No Ref-Primary, Physician

## 2023-12-21 NOTE — PATIENT INSTRUCTIONS
Sleep positioning devices available on [employer]:     Sleep Noodle  SlumberBUMP     SnoreRx, ZQuiet, and VitalSleep are examples of over the counter oral appliances you could try as well.

## 2023-12-21 NOTE — NURSING NOTE
Is the patient currently in the state of [place]? YES    Visit mode:VIDEO    If the visit is dropped, the patient can be reconnected by: VIDEO VISIT: Text to cell phone:   Telephone Information:   Mobile [phone]       Will anyone else be joining the visit? NO  (If patient encounters technical issues they should call [phone]    How would you like to obtain your AVS? MyChart    Are changes needed to the allergy or medication list? No    Reason for visit: RECHESVIN SANCHEZ